# Patient Record
Sex: FEMALE
[De-identification: names, ages, dates, MRNs, and addresses within clinical notes are randomized per-mention and may not be internally consistent; named-entity substitution may affect disease eponyms.]

---

## 2021-12-09 ENCOUNTER — DASHBOARD ENCOUNTERS (OUTPATIENT)
Age: 56
End: 2021-12-09

## 2021-12-09 ENCOUNTER — OFFICE VISIT (OUTPATIENT)
Dept: URBAN - METROPOLITAN AREA CLINIC 118 | Facility: CLINIC | Age: 56
End: 2021-12-09
Payer: COMMERCIAL

## 2021-12-09 DIAGNOSIS — R10.32 LLQ ABDOMINAL PAIN: ICD-10-CM

## 2021-12-09 PROCEDURE — 99204 OFFICE O/P NEW MOD 45 MIN: CPT | Performed by: INTERNAL MEDICINE

## 2021-12-09 PROCEDURE — 99244 OFF/OP CNSLTJ NEW/EST MOD 40: CPT | Performed by: INTERNAL MEDICINE

## 2021-12-09 RX ORDER — HYOSCYAMINE SULFATE 0.125 MG
1 TABLET AS NEEDED TABLET,DISINTEGRATING ORAL
Qty: 60 TABLET | Refills: 3 | OUTPATIENT
Start: 2021-12-09 | End: 2022-04-08

## 2021-12-09 RX ORDER — CHOLECALCIFEROL (VITAMIN D3) 1250 MCG
1 CAPSULE CAPSULE ORAL
Status: ACTIVE | COMMUNITY

## 2021-12-09 NOTE — HPI-TODAY'S VISIT:
The patient is referred by Dr. Mclean for left lower quadrant abdominal pain.  Note was sent.  She states in September she had an episode of diarrhea but no severe fevers, vomiting, or chills.  Her bowel movements returned to normal but she developed intermittent left lower quadrant pain.  Initially it was every day but over the last 2 to 4 weeks has become intermittent and is milder.  She had lost weight, over 10 pounds, in the same time.  But was dealing with running a new business and her new 's new diagnosis of prostate cancer.  She think she was eating a normal amount and did not have a loss of appetite.  Lab work was relatively normal at Dr. Mclean's office, and a CT scan showed only a small right kidney stone but no pathology in the left lower quadrant, including no diverticulosis she has had a previous bilateral oophorectomy.  There is no family history of colon or gynecologic cancer.  The patient is a former smoker.  She has not used recent antibiotics.

## 2024-06-25 ENCOUNTER — OFFICE VISIT (OUTPATIENT)
Dept: URBAN - METROPOLITAN AREA CLINIC 88 | Facility: CLINIC | Age: 59
End: 2024-06-25